# Patient Record
Sex: MALE | Race: OTHER | URBAN - METROPOLITAN AREA
[De-identification: names, ages, dates, MRNs, and addresses within clinical notes are randomized per-mention and may not be internally consistent; named-entity substitution may affect disease eponyms.]

---

## 2019-02-07 VITALS
OXYGEN SATURATION: 100 % | RESPIRATION RATE: 17 BRPM | SYSTOLIC BLOOD PRESSURE: 131 MMHG | HEART RATE: 87 BPM | TEMPERATURE: 98 F | DIASTOLIC BLOOD PRESSURE: 86 MMHG

## 2019-02-07 LAB
ALBUMIN SERPL ELPH-MCNC: 3.6 G/DL — SIGNIFICANT CHANGE UP (ref 3.4–5)
ALP SERPL-CCNC: 118 U/L — SIGNIFICANT CHANGE UP (ref 40–120)
ALT FLD-CCNC: 28 U/L — SIGNIFICANT CHANGE UP (ref 12–42)
ANION GAP SERPL CALC-SCNC: 5 MMOL/L — LOW (ref 9–16)
APTT BLD: 30.9 SEC — SIGNIFICANT CHANGE UP (ref 27.5–36.3)
AST SERPL-CCNC: 21 U/L — SIGNIFICANT CHANGE UP (ref 15–37)
BILIRUB SERPL-MCNC: 0.2 MG/DL — SIGNIFICANT CHANGE UP (ref 0.2–1.2)
BUN SERPL-MCNC: 14 MG/DL — SIGNIFICANT CHANGE UP (ref 7–23)
CALCIUM SERPL-MCNC: 9.2 MG/DL — SIGNIFICANT CHANGE UP (ref 8.5–10.5)
CHLORIDE SERPL-SCNC: 102 MMOL/L — SIGNIFICANT CHANGE UP (ref 96–108)
CO2 SERPL-SCNC: 30 MMOL/L — SIGNIFICANT CHANGE UP (ref 22–31)
CREAT SERPL-MCNC: 0.93 MG/DL — SIGNIFICANT CHANGE UP (ref 0.5–1.3)
CRP SERPL-MCNC: 0.9 MG/DL — SIGNIFICANT CHANGE UP (ref 0–0.9)
GLUCOSE SERPL-MCNC: 108 MG/DL — HIGH (ref 70–99)
HCT VFR BLD CALC: 39.1 % — SIGNIFICANT CHANGE UP (ref 39–50)
HGB BLD-MCNC: 12.4 G/DL — LOW (ref 13–17)
INR BLD: 0.99 — SIGNIFICANT CHANGE UP (ref 0.88–1.16)
LACTATE SERPL-SCNC: 1.6 MMOL/L — SIGNIFICANT CHANGE UP (ref 0.4–2)
MCHC RBC-ENTMCNC: 27.8 PG — SIGNIFICANT CHANGE UP (ref 27–34)
MCHC RBC-ENTMCNC: 31.7 G/DL — LOW (ref 32–36)
MCV RBC AUTO: 87.7 FL — SIGNIFICANT CHANGE UP (ref 80–100)
PCP SPEC-MCNC: SIGNIFICANT CHANGE UP
PLATELET # BLD AUTO: 333 K/UL — SIGNIFICANT CHANGE UP (ref 150–400)
POTASSIUM SERPL-MCNC: 3.8 MMOL/L — SIGNIFICANT CHANGE UP (ref 3.5–5.3)
POTASSIUM SERPL-SCNC: 3.8 MMOL/L — SIGNIFICANT CHANGE UP (ref 3.5–5.3)
PROT SERPL-MCNC: 7.5 G/DL — SIGNIFICANT CHANGE UP (ref 6.4–8.2)
PROTHROM AB SERPL-ACNC: 10.9 SEC — SIGNIFICANT CHANGE UP (ref 10–12.9)
RBC # BLD: 4.46 M/UL — SIGNIFICANT CHANGE UP (ref 4.2–5.8)
RBC # FLD: 14.4 % — SIGNIFICANT CHANGE UP (ref 10.3–14.5)
SODIUM SERPL-SCNC: 137 MMOL/L — SIGNIFICANT CHANGE UP (ref 132–145)
WBC # BLD: 6.3 K/UL — SIGNIFICANT CHANGE UP (ref 3.8–10.5)
WBC # FLD AUTO: 6.3 K/UL — SIGNIFICANT CHANGE UP (ref 3.8–10.5)

## 2019-02-07 PROCEDURE — 73130 X-RAY EXAM OF HAND: CPT | Mod: 26,50

## 2019-02-07 PROCEDURE — 73201 CT UPPER EXTREMITY W/DYE: CPT | Mod: 26,RT

## 2019-02-07 PROCEDURE — 71046 X-RAY EXAM CHEST 2 VIEWS: CPT | Mod: 26

## 2019-02-07 RX ORDER — OXYCODONE AND ACETAMINOPHEN 5; 325 MG/1; MG/1
1 TABLET ORAL ONCE
Qty: 0 | Refills: 0 | Status: DISCONTINUED | OUTPATIENT
Start: 2019-02-07 | End: 2019-02-07

## 2019-02-07 RX ORDER — VANCOMYCIN HCL 1 G
1000 VIAL (EA) INTRAVENOUS ONCE
Qty: 0 | Refills: 0 | Status: COMPLETED | OUTPATIENT
Start: 2019-02-07 | End: 2019-02-07

## 2019-02-07 RX ADMIN — Medication 250 MILLIGRAM(S): at 20:31

## 2019-02-07 RX ADMIN — OXYCODONE AND ACETAMINOPHEN 1 TABLET(S): 5; 325 TABLET ORAL at 20:31

## 2019-02-07 NOTE — ED PROVIDER NOTE - DIAGNOSTIC INTERPRETATION
Xray (wet reads) interpreted by MARLENI CHISHOLM   Xray hands - no soft tissue swelling. no acute fx or dislocation, joint space intact, no effusion noted. No foreign body noted Xray (wet reads) interpreted by MARLENI CHISHOLM   Xray hands - +DJD changes, minimal soft tissue swelling. no acute fx or dislocation, joint space intact, no effusion noted. No foreign body noted

## 2019-02-07 NOTE — ED PROVIDER NOTE - OBJECTIVE STATEMENT
54 yo M with PMHx of 54 yo M with PMHx of HIV, last CD4 unknown, VL undetectable, on HAART, RHD, presenting c/o R hand numbness and pain x 5d.  Pt reports using some crystal meth and GHB 1 wk ago, "passed out a few times during that night and unsure what happened subsequently." Woke up feeling "funny" in his hands.  Noted progressive worsening numbness, swelling, and bruising to the fingertips, R>L, went to clinic today and sent in for further eval.  Denies fever, chills, known trauma, fall, joint pain, CP, SOB, N/V/D/C, focal weakness, rash, abdominal pain, change in urinary/bowel function, malaise, dysuria, hematuria, penile dc and bleeding.  Denies IVDU, however, pt reports being with other users who was injecting and unsure if he was exposed to anything when he was out.

## 2019-02-07 NOTE — ED PROVIDER NOTE - MUSCULOSKELETAL, MLM
Spine appears normal, range of motion is not limited, no muscle or joint tenderness Spine appears normal, b/l hand with mild edema to the distal finger tips, R>L with +janeway lesions to the R fingertips, NV intact, FROM, diminished light touch sensation to the hands b/l, symmetric distal palpable pulses Spine appears normal, b/l hand with mild edema to the distal finger tips, R>L with +janeway lesions to the R fingertips and splinter hemorrhages, NV intact, FROM, diminished light touch sensation to the hands b/l, symmetric distal palpable pulses, compartments soft
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
## 2019-02-07 NOTE — ED ADULT NURSE REASSESSMENT NOTE - NS ED NURSE REASSESS COMMENT FT1
pt upset, states "I was told by the doctor I would be in a bed already". explained to patient that there are no clean beds available at this time. notified charge RN. pt upset, states "I was told by the doctor I would be in a bed already". explained to patient that there are no clean beds available at this time. notified charge RN. comfort measures offered- pt refusing.

## 2019-02-07 NOTE — ED PROVIDER NOTE - PROGRESS NOTE DETAILS
patient admitted. spent night in ED. no beds at [facility]. vancomycin given at 8:30am. repeat AM labs drawn. no pain over night

## 2019-02-07 NOTE — ED ADULT NURSE NOTE - NSIMPLEMENTINTERV_GEN_ALL_ED
Implemented All Universal Safety Interventions:  [place] to call system. Call bell, personal items and telephone within reach. Instruct patient to call for assistance. Room bathroom lighting operational. Non-slip footwear when patient is off stretcher. Physically safe environment: no spills, clutter or unnecessary equipment. Stretcher in lowest position, wheels locked, appropriate side rails in place.

## 2019-02-07 NOTE — ED PROVIDER NOTE - MEDICAL DECISION MAKING DETAILS
pt p/w R hand lesions and numbness sensation from unknown mechanism, +mild swelling to the distal finger tips with lesions concerning for janeway/osler, no associated constitutional sx, afebrile, compartment soft with palpable symmetric distal pulses, sx concerning for endocarditis/septic emboli given history and risk factors, labs with no leukocytosis or lactic acidosis, lytes wnl, CTA with no arterial emboli noted, blood cx obtained and sent, empirically started on dose of IV vanco, case discussed with [facility] for endocarditis r/o, accepted by Dr. Rider, consulted with ortho - Dr. Marroquin for ancillary findings on CTA, unlikely acute or any ortho intervention based on clinical and CT read, can consult hand resident upon arrival PRN

## 2019-02-07 NOTE — ED ADULT NURSE REASSESSMENT NOTE - NS ED NURSE REASSESS COMMENT FT1
received pt from ELVA Kaye- pt resting in stretcher, medications administetred as ordered, in no acute distress.

## 2019-02-08 ENCOUNTER — INPATIENT (INPATIENT)
Facility: HOSPITAL | Age: 54
LOS: 1 days | Discharge: HOME | DRG: 866 | End: 2019-02-10
Attending: INTERNAL MEDICINE | Admitting: INTERNAL MEDICINE
Payer: COMMERCIAL

## 2019-02-08 DIAGNOSIS — F15.10 OTHER STIMULANT ABUSE, UNCOMPLICATED: ICD-10-CM

## 2019-02-08 DIAGNOSIS — A53.9 SYPHILIS, UNSPECIFIED: ICD-10-CM

## 2019-02-08 DIAGNOSIS — Z29.9 ENCOUNTER FOR PROPHYLACTIC MEASURES, UNSPECIFIED: ICD-10-CM

## 2019-02-08 DIAGNOSIS — F19.94 OTHER PSYCHOACTIVE SUBSTANCE USE, UNSPECIFIED WITH PSYCHOACTIVE SUBSTANCE-INDUCED MOOD DISORDER: ICD-10-CM

## 2019-02-08 DIAGNOSIS — F32.9 MAJOR DEPRESSIVE DISORDER, SINGLE EPISODE, UNSPECIFIED: ICD-10-CM

## 2019-02-08 DIAGNOSIS — L02.212 CUTANEOUS ABSCESS OF BACK [ANY PART, EXCEPT BUTTOCK]: Chronic | ICD-10-CM

## 2019-02-08 DIAGNOSIS — F19.10 OTHER PSYCHOACTIVE SUBSTANCE ABUSE, UNCOMPLICATED: ICD-10-CM

## 2019-02-08 DIAGNOSIS — A51.49 OTHER SECONDARY SYPHILITIC CONDITIONS: ICD-10-CM

## 2019-02-08 DIAGNOSIS — Z90.49 ACQUIRED ABSENCE OF OTHER SPECIFIED PARTS OF DIGESTIVE TRACT: Chronic | ICD-10-CM

## 2019-02-08 DIAGNOSIS — Z98.890 OTHER SPECIFIED POSTPROCEDURAL STATES: Chronic | ICD-10-CM

## 2019-02-08 DIAGNOSIS — B20 HUMAN IMMUNODEFICIENCY VIRUS [HIV] DISEASE: ICD-10-CM

## 2019-02-08 DIAGNOSIS — Z91.89 OTHER SPECIFIED PERSONAL RISK FACTORS, NOT ELSEWHERE CLASSIFIED: ICD-10-CM

## 2019-02-08 DIAGNOSIS — L03.90 CELLULITIS, UNSPECIFIED: ICD-10-CM

## 2019-02-08 LAB
ALBUMIN SERPL ELPH-MCNC: 3.2 G/DL — LOW (ref 3.4–5)
ALP SERPL-CCNC: 107 U/L — SIGNIFICANT CHANGE UP (ref 40–120)
ALT FLD-CCNC: 28 U/L — SIGNIFICANT CHANGE UP (ref 12–42)
ANION GAP SERPL CALC-SCNC: 9 MMOL/L — SIGNIFICANT CHANGE UP (ref 9–16)
AST SERPL-CCNC: 20 U/L — SIGNIFICANT CHANGE UP (ref 15–37)
BASOPHILS NFR BLD AUTO: 0.3 % — SIGNIFICANT CHANGE UP (ref 0–2)
BILIRUB SERPL-MCNC: 0.1 MG/DL — LOW (ref 0.2–1.2)
BUN SERPL-MCNC: 15 MG/DL — SIGNIFICANT CHANGE UP (ref 7–23)
C TRACH RRNA SPEC QL NAA+PROBE: SIGNIFICANT CHANGE UP
CALCIUM SERPL-MCNC: 8.9 MG/DL — SIGNIFICANT CHANGE UP (ref 8.5–10.5)
CHLORIDE SERPL-SCNC: 103 MMOL/L — SIGNIFICANT CHANGE UP (ref 96–108)
CO2 SERPL-SCNC: 27 MMOL/L — SIGNIFICANT CHANGE UP (ref 22–31)
CREAT SERPL-MCNC: 0.91 MG/DL — SIGNIFICANT CHANGE UP (ref 0.5–1.3)
EOSINOPHIL NFR BLD AUTO: 1.4 % — SIGNIFICANT CHANGE UP (ref 0–6)
GLUCOSE SERPL-MCNC: 102 MG/DL — HIGH (ref 70–99)
HCT VFR BLD CALC: 38.6 % — LOW (ref 39–50)
HGB BLD-MCNC: 12.4 G/DL — LOW (ref 13–17)
IMM GRANULOCYTES NFR BLD AUTO: 0.3 % — SIGNIFICANT CHANGE UP (ref 0–1.5)
LYMPHOCYTES # BLD AUTO: 26.2 % — SIGNIFICANT CHANGE UP (ref 13–44)
MCHC RBC-ENTMCNC: 27.9 PG — SIGNIFICANT CHANGE UP (ref 27–34)
MCHC RBC-ENTMCNC: 32.1 G/DL — SIGNIFICANT CHANGE UP (ref 32–36)
MCV RBC AUTO: 86.9 FL — SIGNIFICANT CHANGE UP (ref 80–100)
MONOCYTES NFR BLD AUTO: 10.1 % — SIGNIFICANT CHANGE UP (ref 2–14)
N GONORRHOEA RRNA SPEC QL NAA+PROBE: SIGNIFICANT CHANGE UP
NEUTROPHILS NFR BLD AUTO: 61.7 % — SIGNIFICANT CHANGE UP (ref 43–77)
PLATELET # BLD AUTO: 327 K/UL — SIGNIFICANT CHANGE UP (ref 150–400)
POTASSIUM SERPL-MCNC: 4.2 MMOL/L — SIGNIFICANT CHANGE UP (ref 3.5–5.3)
POTASSIUM SERPL-SCNC: 4.2 MMOL/L — SIGNIFICANT CHANGE UP (ref 3.5–5.3)
PROT SERPL-MCNC: 6.8 G/DL — SIGNIFICANT CHANGE UP (ref 6.4–8.2)
RBC # BLD: 4.44 M/UL — SIGNIFICANT CHANGE UP (ref 4.2–5.8)
RBC # FLD: 14.5 % — SIGNIFICANT CHANGE UP (ref 10.3–14.5)
SODIUM SERPL-SCNC: 139 MMOL/L — SIGNIFICANT CHANGE UP (ref 132–145)
SPECIMEN SOURCE: SIGNIFICANT CHANGE UP
T PALLIDUM AB TITR SER: POSITIVE
WBC # BLD: 6.2 K/UL — SIGNIFICANT CHANGE UP (ref 3.8–10.5)
WBC # FLD AUTO: 6.2 K/UL — SIGNIFICANT CHANGE UP (ref 3.8–10.5)

## 2019-02-08 PROCEDURE — 93010 ELECTROCARDIOGRAM REPORT: CPT

## 2019-02-08 PROCEDURE — 73130 X-RAY EXAM OF HAND: CPT | Mod: 26,50

## 2019-02-08 PROCEDURE — 71250 CT THORAX DX C-: CPT | Mod: 26

## 2019-02-08 PROCEDURE — 99285 EMERGENCY DEPT VISIT HI MDM: CPT | Mod: 25

## 2019-02-08 PROCEDURE — 99223 1ST HOSP IP/OBS HIGH 75: CPT | Mod: GC

## 2019-02-08 RX ORDER — PENICILLIN G BENZATHINE 1200000 [IU]/2ML
2.4 INJECTION, SUSPENSION INTRAMUSCULAR ONCE
Qty: 0 | Refills: 0 | Status: COMPLETED | OUTPATIENT
Start: 2019-02-08 | End: 2019-02-08

## 2019-02-08 RX ORDER — VANCOMYCIN HCL 1 G
VIAL (EA) INTRAVENOUS
Qty: 0 | Refills: 0 | Status: DISCONTINUED | OUTPATIENT
Start: 2019-02-08 | End: 2019-02-09

## 2019-02-08 RX ORDER — OXYCODONE AND ACETAMINOPHEN 5; 325 MG/1; MG/1
1 TABLET ORAL ONCE
Qty: 0 | Refills: 0 | Status: DISCONTINUED | OUTPATIENT
Start: 2019-02-08 | End: 2019-02-08

## 2019-02-08 RX ORDER — ELVITEGRAVIR, COBICISTAT, EMTRICITABINE, AND TENOFOVIR ALAFENAMIDE 150; 150; 200; 10 MG/1; MG/1; MG/1; MG/1
1 TABLET ORAL
Qty: 0 | Refills: 0 | COMMUNITY

## 2019-02-08 RX ORDER — ELVITEGRAVIR, COBICISTAT, EMTRICITABINE, AND TENOFOVIR ALAFENAMIDE 150; 150; 200; 10 MG/1; MG/1; MG/1; MG/1
1 TABLET ORAL DAILY
Qty: 0 | Refills: 0 | Status: DISCONTINUED | OUTPATIENT
Start: 2019-02-08 | End: 2019-02-10

## 2019-02-08 RX ORDER — HYDROMORPHONE HYDROCHLORIDE 2 MG/ML
1 INJECTION INTRAMUSCULAR; INTRAVENOUS; SUBCUTANEOUS ONCE
Qty: 0 | Refills: 0 | Status: DISCONTINUED | OUTPATIENT
Start: 2019-02-08 | End: 2019-02-08

## 2019-02-08 RX ORDER — VANCOMYCIN HCL 1 G
1250 VIAL (EA) INTRAVENOUS EVERY 12 HOURS
Qty: 0 | Refills: 0 | Status: DISCONTINUED | OUTPATIENT
Start: 2019-02-09 | End: 2019-02-09

## 2019-02-08 RX ORDER — VANCOMYCIN HCL 1 G
1250 VIAL (EA) INTRAVENOUS ONCE
Qty: 0 | Refills: 0 | Status: COMPLETED | OUTPATIENT
Start: 2019-02-08 | End: 2019-02-08

## 2019-02-08 RX ORDER — VANCOMYCIN HCL 1 G
1000 VIAL (EA) INTRAVENOUS ONCE
Qty: 0 | Refills: 0 | Status: COMPLETED | OUTPATIENT
Start: 2019-02-08 | End: 2019-02-08

## 2019-02-08 RX ORDER — MOMETASONE FUROATE 50 UG/1
2 SPRAY NASAL
Qty: 0 | Refills: 0 | COMMUNITY

## 2019-02-08 RX ADMIN — PENICILLIN G BENZATHINE 2.4 MILLION UNIT(S): 1200000 INJECTION, SUSPENSION INTRAMUSCULAR at 18:35

## 2019-02-08 RX ADMIN — ELVITEGRAVIR, COBICISTAT, EMTRICITABINE, AND TENOFOVIR ALAFENAMIDE 1 TABLET(S): 150; 150; 200; 10 TABLET ORAL at 22:31

## 2019-02-08 RX ADMIN — HYDROMORPHONE HYDROCHLORIDE 1 MILLIGRAM(S): 2 INJECTION INTRAMUSCULAR; INTRAVENOUS; SUBCUTANEOUS at 13:27

## 2019-02-08 RX ADMIN — Medication 166.67 MILLIGRAM(S): at 22:31

## 2019-02-08 RX ADMIN — Medication 250 MILLIGRAM(S): at 07:18

## 2019-02-08 RX ADMIN — OXYCODONE AND ACETAMINOPHEN 1 TABLET(S): 5; 325 TABLET ORAL at 17:32

## 2019-02-08 NOTE — H&P ADULT - NSHPLABSRESULTS_GEN_ALL_CORE
.  LABS:                         12.4   6.2   )-----------( 327      ( 08 Feb 2019 07:24 )             38.6     02-08    139  |  103  |  15  ----------------------------<  102<H>  4.2   |  27  |  0.91    Ca    8.9      08 Feb 2019 07:24    TPro  6.8  /  Alb  3.2<L>  /  TBili  0.1<L>  /  DBili  x   /  AST  20  /  ALT  28  /  AlkPhos  107  02-08    PT/INR - ( 07 Feb 2019 16:28 )   PT: 10.9 sec;   INR: 0.99          PTT - ( 07 Feb 2019 16:28 )  PTT:30.9 sec              RADIOLOGY, EKG & ADDITIONAL TESTS: Reviewed.

## 2019-02-08 NOTE — H&P ADULT - ASSESSMENT
52 yo M with PMHx of HIV, last CD4 unknown, VL undetectable, on HAART, RHD, presenting c/o R hand numbness and pain x 5d. 52 yo M with PMHx of HIV, last CD4 unknown, VL undetectable, on HAART, RHD, presenting c/o R hand numbness and pain x 5d, with exam findings concerning for endocarditis, admitted for traetment/workup of endocarditis.

## 2019-02-08 NOTE — H&P ADULT - PROBLEM SELECTOR PLAN 1
Patient presenting with pain at fingertips of hands b/l, +splinter hemorrhage and painful ecchymotic lesions (?Osler nodes) on R hand, no appreciable murmur on exam. Findings concerning for endocarditis given polysubstance abuse (though denies IVDU)  -s/p vancomycin in ED, c/w current regimen 1.25 mg vancomycin q12h for empiric treatment of endocarditis  -f/u Bcx  -f/u ECHO to assess for vegetations Patient presenting with pain at fingertips of hands b/l, +splinter hemorrhage and painful ecchymotic lesions (Janeway lesions/  Osler nodes) on R hand, no appreciable murmur on exam. Findings concerning for endocarditis given polysubstance abuse (though denies IVDU)  -s/p vancomycin in ED, c/w current regimen 1.25 mg vancomycin q12h for empiric treatment of endocarditis  -f/u Bcx  -f/u ECHO to assess for vegetations

## 2019-02-08 NOTE — H&P ADULT - PROBLEM SELECTOR PLAN 2
Patient with history of HIV, per patient CD4 800-900 with undetectable VL approximately 6 months ago), non-compliant with outpatient Genvoya  -c/w home medication, Genvoya  -f/u VL/CD4   -HIV consult in AM

## 2019-02-08 NOTE — H&P ADULT - FAMILY HISTORY
Mother  Still living? Unknown  Family history of systemic lupus erythematosus (SLE) in mother, Age at diagnosis: Age Unknown     Sibling  Still living? Unknown  Family history of diabetes mellitus, Age at diagnosis: Age Unknown Mother  Still living? Unknown  Family history of systemic lupus erythematosus (SLE) in mother, Age at diagnosis: Age Unknown     Sibling  Still living? Unknown  Family history of diabetes mellitus, Age at diagnosis: Age Unknown     Father  Still living? Unknown  Family history of diabetes mellitus (DM), Age at diagnosis: Age Unknown

## 2019-02-08 NOTE — H&P ADULT - ATTENDING COMMENTS
patient seen and examined    reviewed data including:  labs, available radiological reports/ studies, ekg, previous admission chart notes and/or imaging      agree w/ PE findings as above w/ additions/ exceptions/ pertinent findings: +tenderness on palpation of right hand / fingers;  exam deferred, findings per PGY2; rest of exam as above     1. hand lesions r/o endocarditis given polysubstance hx; started on vancomycin empirically; follwoup ctxs and  ECHO  2. IVDA: encourage cessation    rest of plan as above

## 2019-02-08 NOTE — H&P ADULT - PROBLEM SELECTOR PLAN 8
1) PCP Contacted on Admission: (Y/N) --> Name & Phone #:  Follows at outpatient LGBT clinic [facility]; Amado Kimble MD (ID specialist @ [facility])  2) Date of Contact with PCP:  3) PCP Contacted at Discharge: (Y/N)  4) Summary of Handoff Given to PCP:   5) Post-Discharge Appointment Date and Location:

## 2019-02-08 NOTE — H&P ADULT - PROBLEM SELECTOR PLAN 5
F: Patient with history of polysubstance abuse, current methamphetamine use (last used: 5 days ago), with prior crack/Marijuana use. Utox negative on admission  - consult for polysubstance abuse

## 2019-02-08 NOTE — BEHAVIORAL HEALTH ASSESSMENT NOTE - PROBLEM SELECTOR PLAN 1
-- Does NOT meet criteria for involuntary psychiatric hospitalization.  -- Does NOT require psychiatric 1:1 observation  -- Provided with referrals to outpatient psychiatric clinics.

## 2019-02-08 NOTE — ED ADULT NURSE REASSESSMENT NOTE - NS ED NURSE REASSESS COMMENT FT1
pt resting in stretcher in no acute distress. no clean bed at [facility]. awaiting clean bed and transfer.

## 2019-02-08 NOTE — BEHAVIORAL HEALTH ASSESSMENT NOTE - PROBLEM SELECTOR PLAN 2
-- Provided with referrals for substance use treatment  -- Denies alcohol, benzo, opiate use, however recommend remaining alert for withdrawal symptoms.

## 2019-02-08 NOTE — H&P ADULT - PROBLEM SELECTOR PLAN 7
1) PCP Contacted on Admission: (Y/N) --> Name & Phone #:  Follows at outpatient LGBT clinic [facility]; Amado Kimble MD (ID specialist @ [facility])  2) Date of Contact with PCP:  3) PCP Contacted at Discharge: (Y/N)  4) Summary of Handoff Given to PCP:   5) Post-Discharge Appointment Date and Location: F: no standing IVF indicated at this time  E: Replete electrolytes PRN, Goal: K>4, Mg>2  N: Regular diet    DVT ppx: ambulatory, no chemoppx indicated at this time   CODE: FULL  Dispo: Admit to [facility]

## 2019-02-08 NOTE — H&P ADULT - PROBLEM SELECTOR PLAN 3
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
## 2019-02-08 NOTE — H&P ADULT - PROBLEM SELECTOR PLAN 6
1) PCP Contacted on Admission: (Y/N) --> Name & Phone #:  2) Date of Contact with PCP:  3) PCP Contacted at Discharge: (Y/N)  4) Summary of Handoff Given to PCP:   5) Post-Discharge Appointment Date and Location: F: no standing IVF indicated at this time  E: Replete electrolytes PRN, Goal: K>4, Mg>2  N: Regular diet    DVT ppx: ambulatory, no chemoppx indicated at this time   CODE: FULL  Dispo: Admit to [facility] Patient with history of depression/anxiety, expressing suicidal thoughts however with no intent or plan.   -Psych consulted recommending no intervention or initiation of anti-depressants at this time  -Patient provided information for outpatient follow up

## 2019-02-08 NOTE — H&P ADULT - NSHPSOCIALHISTORY_GEN_ALL_CORE
Current smokes cigarettes (since the age of 11, smokes when intoxicated), denies EtOH use, Current Methamphetamine use (uses approx 4 out of 7 days of the week for 3-4 years), prior crack use and marijuana use

## 2019-02-08 NOTE — H&P ADULT - NSHPPHYSICALEXAM_GEN_ALL_CORE
.  VITAL SIGNS:  T(C): 36.4 (02-08-19 @ 19:55), Max: 37.2 (02-07-19 @ 20:52)  T(F): 97.5 (02-08-19 @ 19:55), Max: 98.9 (02-07-19 @ 20:52)  HR: 67 (02-08-19 @ 19:55) (67 - 82)  BP: 115/74 (02-08-19 @ 19:55) (104/66 - 128/69)  BP(mean): --  RR: 18 (02-08-19 @ 19:55) (16 - 18)  SpO2: 97% (02-08-19 @ 19:55) (97% - 100%)  Wt(kg): --    PHYSICAL EXAM:    Constitutional: WDWN resting comfortably in bed; NAD  Head: NC/AT  Eyes: PERRL, EOMI, anicteric sclera  ENT: no nasal discharge; uvula midline, no oropharyngeal erythema or exudates; MMM  Neck: supple; no JVD or thyromegaly  Respiratory: CTA B/L; no W/R/R, no retractions  Cardiac: +S1/S2; RRR; no M/R/G; PMI non-displaced  Gastrointestinal: abdomen soft, NT/ND; no rebound or guarding; +BSx4  Genitourinary: normal external genitalia  Back: spine midline, no bony tenderness or step-offs; no CVAT B/L  Extremities: WWP, no clubbing or cyanosis; no peripheral edema  Musculoskeletal: NROM x4; no joint swelling, tenderness or erythema  Vascular: 2+ radial, femoral, DP/PT pulses B/L  Dermatologic: skin warm, dry and intact; no rashes, wounds, or scars  Lymphatic: no submandibular or cervical LAD  Neurologic: AAOx3; CNII-XII grossly intact; no focal deficits  Psychiatric: affect and characteristics of appearance, verbalizations, behaviors are appropriate .  VITAL SIGNS:  T(C): 36.4 (02-08-19 @ 19:55), Max: 37.2 (02-07-19 @ 20:52)  T(F): 97.5 (02-08-19 @ 19:55), Max: 98.9 (02-07-19 @ 20:52)  HR: 67 (02-08-19 @ 19:55) (67 - 82)  BP: 115/74 (02-08-19 @ 19:55) (104/66 - 128/69)  BP(mean): --  RR: 18 (02-08-19 @ 19:55) (16 - 18)  SpO2: 97% (02-08-19 @ 19:55) (97% - 100%)  Wt(kg): --    PHYSICAL EXAM:    Constitutional: thin male, resting comfortably in bed; NAD, partner at bedside, flat affect  Head: NC/AT  Eyes: PERRL, EOMI, anicteric sclera  ENT: no nasal discharge; uvula midline, no oropharyngeal erythema or exudates or sores; MMM  Neck: supple; no JVD or thyromegaly  Respiratory: CTA B/L; no W/R/R, no retractions  Cardiac: +S1/S2; RRR; no M/R/G; PMI non-displaced  Gastrointestinal: abdomen soft, NT/ND; no rebound or guarding; +BSx4  Genitourinary: normal external genitalia  Back: spine midline, no bony tenderness or step-offs; no CVAT B/L; 3-4 cm keloid on L thoracic side of back (from prior abscess drainage, likely I&D)  Extremities: WWP, no clubbing or cyanosis; no peripheral edema  Musculoskeletal: NROM x4; no joint swelling, tenderness or erythema  Vascular: 2+ radial, femoral, DP/PT pulses B/L  Dermatologic: splinter hemorrhage under R hand thumb nail bed, ecchymotic lesions at tips of fingers of R hand, macular lesions on palmar surface of hands b/l  Lymphatic: no submandibular or cervical LAD  Neurologic: AAOx3; CNII-XII grossly intact; no focal deficits  Psychiatric: flat affect

## 2019-02-08 NOTE — BEHAVIORAL HEALTH ASSESSMENT NOTE - HPI (INCLUDE ILLNESS QUALITY, SEVERITY, DURATION, TIMING, CONTEXT, MODIFYING FACTORS, ASSOCIATED SIGNS AND SYMPTOMS)
Patient is a 54 yo M with PMHx of HIV, last CD4 unknown, VL undetectable, on HAART, RHD,  and PPHx of stimulant use disorder who presents to [facility] c/o R hand numbness and pain x 5d. Found by primary team to have progressive worsening numbness, swelling, and bruising to the fingertips, R>L.  Denies IVDU, however, pt reports being with other users who was injecting and unsure if he was exposed to anything when he was out. Currently being worked up for endocarditis. Patient admitted to low mood and experiencing suicidal thoughts without intent or plan two weeks ago. Psychiatry consulted to evaluate SI.     On interview patient reports that during the few week he has had significant use of crystal meth and GHB. He notes that he cannot tell if his memories were of events that actually happened or not given his intoxication during those times. However he notes that during his periods of sobriety his mood was very low. Two weeks ago he had passive suicidal thoughts, but firmly denies any intent or plan to kill himself. He cites his children and his  as protective factors. Notes that his mood drops this way every time he uses these substances. Mood typically recovers, though notes that he has stressors in his life. In particular he regrets career decisions he made over the past two years. Currently denies any suicidal thoughts, plans, or intent. He denies HI/AVH. He is notably future and goal oriented, eager to receive information about psychiatric care for depression and substance use. He also notes that he has an excellent support system through [employer], which he actively participates in. He is comfortable alerting staff if his mood worsens or he develops passive or active SI.

## 2019-02-08 NOTE — H&P ADULT - PROBLEM SELECTOR PLAN 4
Patient with history of depression/anxiety, expressing suicidal thoughts however with no intent or plan.   -Psych consulted recommending no intervention or initiation of anti-depressants at this time  -Patient provided information for outpatient follow up mild; monitor CBC, check iron studies if worsening

## 2019-02-08 NOTE — BEHAVIORAL HEALTH ASSESSMENT NOTE - SUMMARY
Patient is a 54 yo M with PMHx of HIV, last CD4 unknown, VL undetectable, on HAART, RHD,  and PPHx of stimulant use disorder who presents to [facility] c/o R hand numbness and pain x 5d. Currently being worked up for endocarditis. Patient admitted to low mood and experiencing suicidal thoughts without intent or plan two weeks ago. Psychiatry consulted to evaluate SI.     Based on evaluation, patient is likely experiencing post-stimulant use drop in mood, consistent with his report of his mood fluctuations in the past. While he admits to recent passive SI he firmly denies any active thoughts, plans, or intent. Family is a strong protective factor. He also has a good support system through narcotic anonymous. At this time patient is not an acute danger to himself or others, does not meet criteria for involuntary psychiatric hospitalization. He would benefit most from outpatient psychiatric and substance use treatment. Patient was provided with referrals.

## 2019-02-08 NOTE — BEHAVIORAL HEALTH ASSESSMENT NOTE - RISK ASSESSMENT
Patient at elevated chronic risk of self harm given history of significant substance use, however currently not an acute danger to himself. Risk is mitigated by protective factors (family is reason to live), support system, future/goal orientation.

## 2019-02-08 NOTE — H&P ADULT - PMH
Anxiety    Depression    HIV (human immunodeficiency virus infection)    Pneumocystis carinii pneumonia  2007

## 2019-02-09 DIAGNOSIS — I38 ENDOCARDITIS, VALVE UNSPECIFIED: ICD-10-CM

## 2019-02-09 DIAGNOSIS — F19.10 OTHER PSYCHOACTIVE SUBSTANCE ABUSE, UNCOMPLICATED: ICD-10-CM

## 2019-02-09 DIAGNOSIS — D64.9 ANEMIA, UNSPECIFIED: ICD-10-CM

## 2019-02-09 LAB
-  COAGULASE NEGATIVE STAPHYLOCOCCUS: SIGNIFICANT CHANGE UP
ANION GAP SERPL CALC-SCNC: 9 MMOL/L — SIGNIFICANT CHANGE UP (ref 5–17)
APPEARANCE UR: CLEAR — SIGNIFICANT CHANGE UP
BILIRUB UR-MCNC: NEGATIVE — SIGNIFICANT CHANGE UP
BUN SERPL-MCNC: 16 MG/DL — SIGNIFICANT CHANGE UP (ref 7–23)
CALCIUM SERPL-MCNC: 8.8 MG/DL — SIGNIFICANT CHANGE UP (ref 8.4–10.5)
CHLORIDE SERPL-SCNC: 101 MMOL/L — SIGNIFICANT CHANGE UP (ref 96–108)
CO2 SERPL-SCNC: 27 MMOL/L — SIGNIFICANT CHANGE UP (ref 22–31)
COLOR SPEC: YELLOW — SIGNIFICANT CHANGE UP
CREAT SERPL-MCNC: 0.84 MG/DL — SIGNIFICANT CHANGE UP (ref 0.5–1.3)
DIFF PNL FLD: NEGATIVE — SIGNIFICANT CHANGE UP
GLUCOSE SERPL-MCNC: 105 MG/DL — HIGH (ref 70–99)
GLUCOSE UR QL: NEGATIVE — SIGNIFICANT CHANGE UP
GRAM STN FLD: SIGNIFICANT CHANGE UP
HCT VFR BLD CALC: 38.9 % — LOW (ref 39–50)
HGB BLD-MCNC: 12.1 G/DL — LOW (ref 13–17)
KETONES UR-MCNC: NEGATIVE — SIGNIFICANT CHANGE UP
LEUKOCYTE ESTERASE UR-ACNC: NEGATIVE — SIGNIFICANT CHANGE UP
MAGNESIUM SERPL-MCNC: 2.1 MG/DL — SIGNIFICANT CHANGE UP (ref 1.6–2.6)
MCHC RBC-ENTMCNC: 27.8 PG — SIGNIFICANT CHANGE UP (ref 27–34)
MCHC RBC-ENTMCNC: 31.1 GM/DL — LOW (ref 32–36)
MCV RBC AUTO: 89.2 FL — SIGNIFICANT CHANGE UP (ref 80–100)
METHOD TYPE: SIGNIFICANT CHANGE UP
NITRITE UR-MCNC: NEGATIVE — SIGNIFICANT CHANGE UP
NRBC # BLD: 0 /100 WBCS — SIGNIFICANT CHANGE UP (ref 0–0)
PH UR: 7 — SIGNIFICANT CHANGE UP (ref 5–8)
PLATELET # BLD AUTO: 309 K/UL — SIGNIFICANT CHANGE UP (ref 150–400)
POTASSIUM SERPL-MCNC: 4.2 MMOL/L — SIGNIFICANT CHANGE UP (ref 3.5–5.3)
POTASSIUM SERPL-SCNC: 4.2 MMOL/L — SIGNIFICANT CHANGE UP (ref 3.5–5.3)
PROT UR-MCNC: NEGATIVE MG/DL — SIGNIFICANT CHANGE UP
RAPID RVP RESULT: SIGNIFICANT CHANGE UP
RBC # BLD: 4.36 M/UL — SIGNIFICANT CHANGE UP (ref 4.2–5.8)
RBC # FLD: 14.4 % — SIGNIFICANT CHANGE UP (ref 10.3–14.5)
SODIUM SERPL-SCNC: 137 MMOL/L — SIGNIFICANT CHANGE UP (ref 135–145)
SP GR SPEC: 1.01 — SIGNIFICANT CHANGE UP (ref 1–1.03)
UROBILINOGEN FLD QL: 0.2 E.U./DL — SIGNIFICANT CHANGE UP
VANCOMYCIN TROUGH SERPL-MCNC: 9.7 UG/ML — LOW (ref 10–20)
WBC # BLD: 6.88 K/UL — SIGNIFICANT CHANGE UP (ref 3.8–10.5)
WBC # FLD AUTO: 6.88 K/UL — SIGNIFICANT CHANGE UP (ref 3.8–10.5)

## 2019-02-09 PROCEDURE — 99233 SBSQ HOSP IP/OBS HIGH 50: CPT

## 2019-02-09 RX ORDER — OXYCODONE AND ACETAMINOPHEN 5; 325 MG/1; MG/1
1 TABLET ORAL EVERY 6 HOURS
Qty: 0 | Refills: 0 | Status: DISCONTINUED | OUTPATIENT
Start: 2019-02-09 | End: 2019-02-10

## 2019-02-09 RX ORDER — VANCOMYCIN HCL 1 G
1500 VIAL (EA) INTRAVENOUS EVERY 12 HOURS
Qty: 0 | Refills: 0 | Status: DISCONTINUED | OUTPATIENT
Start: 2019-02-09 | End: 2019-02-10

## 2019-02-09 RX ADMIN — Medication 166.67 MILLIGRAM(S): at 11:54

## 2019-02-09 RX ADMIN — ELVITEGRAVIR, COBICISTAT, EMTRICITABINE, AND TENOFOVIR ALAFENAMIDE 1 TABLET(S): 150; 150; 200; 10 TABLET ORAL at 11:54

## 2019-02-09 NOTE — PROGRESS NOTE ADULT - PROBLEM SELECTOR PLAN 4
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

## 2019-02-09 NOTE — PROGRESS NOTE ADULT - PROBLEM SELECTOR PLAN 3
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

## 2019-02-09 NOTE — PROGRESS NOTE ADULT - PROBLEM SELECTOR PLAN 5
Patient with history of polysubstance abuse, current methamphetamine use (last used: 5 days ago), with prior crack/Marijuana use. Utox negative on admission  - consult for polysubstance abuse Mild anemia. Hgb 12.1   - Continue to monitor CBC   - Order Fe studies if continues to worsen

## 2019-02-09 NOTE — PROGRESS NOTE ADULT - PROBLEM SELECTOR PLAN 7
F: no standing IVF indicated at this time  E: Replete electrolytes PRN, Goal: K>4, Mg>2  N: Regular diet    DVT ppx: ambulatory, no chemoppx indicated at this time   CODE: FULL  Dispo: Admit to [facility] F: no IVF at this time   E: Replete electrolytes PRN, Goal: K>4, Mg>2  N: Regular diet    DVT ppx: ambulatory, no chemoppx indicated at this time   CODE: FULL  Dispo: Admit to [facility]

## 2019-02-09 NOTE — PROGRESS NOTE ADULT - PROBLEM SELECTOR PLAN 6
Patient with history of depression/anxiety, expressing suicidal thoughts however with no intent or plan.   -Psych consulted recommending no intervention or initiation of anti-depressants at this time  -Patient provided information for outpatient follow up H/o depression/anxiety, expressing suicidal ideations on adm  -Psych consulted recommending no intervention or initiation of anti-depressants at this time  -Patient provided information for outpatient follow up

## 2019-02-09 NOTE — PROGRESS NOTE ADULT - PROBLEM SELECTOR PLAN 2
Patient with history of HIV, per patient CD4 800-900 with undetectable VL approximately 6 months ago), non-compliant with outpatient Genvoya  -c/w home medication, Genvoya  -f/u VL/CD4   -HIV consult in AM Polysubstance abuse. Methamphetamine with GHB daily. Denies IVDU. High likelihood of IVDU based on social history.   - CIWA 7-8 ( nausea, Anxiety, tremor, tactile disturbances)  - Serial CIWA q4-6h  - Consider giving Ativan if CIWA >8, symptoms worsen

## 2019-02-09 NOTE — PROGRESS NOTE ADULT - PROBLEM SELECTOR PLAN 1
Patient presenting with pain at fingertips of hands b/l, +splinter hemorrhage and painful ecchymotic lesions (Janeway lesions/  Osler nodes) on R hand, no appreciable murmur on exam. Findings concerning for endocarditis given polysubstance abuse (though denies IVDU)  -s/p vancomycin in ED, c/w current regimen 1.25 mg vancomycin q12h for empiric treatment of endocarditis  -f/u Bcx  -f/u ECHO to assess for vegetations P/w numbness and sharp pain of hands and fingertips bilat (R>L) +splinter hemorrhage and painful ecchymotic lesions (Janeway lesions/ Osler nodes) on R hand, Hard to palpation of R index finger with ecchymosis. No appreciable murmur on exam. Findings concerning for endocarditis given polysubstance abuse (though denies IVDU)  - C/w vancomycin 1.25 mg vancomycin q12h for empiric treatment of endocarditis  - F/u Trough 22:00  - Restart Vancomycin after vanc trough results   - F/u Bcx. NGTD @ 1 day   - If patient spikes, will draw new Bcx   - F/u ECHO to assess for vegetations. Concern for bact endocarditis

## 2019-02-09 NOTE — PROGRESS NOTE ADULT - PROBLEM SELECTOR PLAN 8
1) PCP Contacted on Admission: (Y/N) --> Name & Phone #:  Follows at outpatient LGBT clinic [facility]; Amado Kimble MD (ID specialist @ [facility])  2) Date of Contact with PCP:  3) PCP Contacted at Discharge: (Y/N)  4) Summary of Handoff Given to PCP:   5) Post-Discharge Appointment Date and Location: 1) PCP Contacted on Admission: (Y/N) --> Name & Phone #:  Follows at outpatient LGBT clinic [facility]; Amado Kimble MD (ID specialist @ [facility])  2) Date of Contact with PCP: N/a  3) PCP Contacted at Discharge: (Y/N)  4) Summary of Handoff Given to PCP: N/a  5) Post-Discharge Appointment Date and Location: [facility]
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
## 2019-02-09 NOTE — PROGRESS NOTE ADULT - ASSESSMENT
54 yo M with PMHx of HIV (last CD4 unknown, VL undetectable, on HAART, RHD) presenting c/o R hand numbness and pain x 5d, with exam findings concerning for endocarditis, admitted for treatment/workup of endocarditis.

## 2019-02-10 VITALS
HEART RATE: 78 BPM | OXYGEN SATURATION: 98 % | SYSTOLIC BLOOD PRESSURE: 114 MMHG | TEMPERATURE: 98 F | DIASTOLIC BLOOD PRESSURE: 67 MMHG | RESPIRATION RATE: 20 BRPM

## 2019-02-10 LAB
ANION GAP SERPL CALC-SCNC: 9 MMOL/L — SIGNIFICANT CHANGE UP (ref 5–17)
BUN SERPL-MCNC: 15 MG/DL — SIGNIFICANT CHANGE UP (ref 7–23)
CALCIUM SERPL-MCNC: 9.1 MG/DL — SIGNIFICANT CHANGE UP (ref 8.4–10.5)
CHLORIDE SERPL-SCNC: 101 MMOL/L — SIGNIFICANT CHANGE UP (ref 96–108)
CO2 SERPL-SCNC: 28 MMOL/L — SIGNIFICANT CHANGE UP (ref 22–31)
CREAT SERPL-MCNC: 0.89 MG/DL — SIGNIFICANT CHANGE UP (ref 0.5–1.3)
CULTURE RESULTS: SIGNIFICANT CHANGE UP
ERYTHROCYTE [SEDIMENTATION RATE] IN BLOOD: 11 MM/HR — SIGNIFICANT CHANGE UP
GLUCOSE SERPL-MCNC: 106 MG/DL — HIGH (ref 70–99)
GRAM STN FLD: SIGNIFICANT CHANGE UP
HCT VFR BLD CALC: 40 % — SIGNIFICANT CHANGE UP (ref 39–50)
HGB BLD-MCNC: 12.5 G/DL — LOW (ref 13–17)
MAGNESIUM SERPL-MCNC: 2.1 MG/DL — SIGNIFICANT CHANGE UP (ref 1.6–2.6)
MCHC RBC-ENTMCNC: 28 PG — SIGNIFICANT CHANGE UP (ref 27–34)
MCHC RBC-ENTMCNC: 31.3 GM/DL — LOW (ref 32–36)
MCV RBC AUTO: 89.5 FL — SIGNIFICANT CHANGE UP (ref 80–100)
NRBC # BLD: 0 /100 WBCS — SIGNIFICANT CHANGE UP (ref 0–0)
PLATELET # BLD AUTO: 319 K/UL — SIGNIFICANT CHANGE UP (ref 150–400)
POTASSIUM SERPL-MCNC: 4.4 MMOL/L — SIGNIFICANT CHANGE UP (ref 3.5–5.3)
POTASSIUM SERPL-SCNC: 4.4 MMOL/L — SIGNIFICANT CHANGE UP (ref 3.5–5.3)
RBC # BLD: 4.47 M/UL — SIGNIFICANT CHANGE UP (ref 4.2–5.8)
RBC # FLD: 14.3 % — SIGNIFICANT CHANGE UP (ref 10.3–14.5)
SODIUM SERPL-SCNC: 138 MMOL/L — SIGNIFICANT CHANGE UP (ref 135–145)
SPECIMEN SOURCE: SIGNIFICANT CHANGE UP
WBC # BLD: 7.42 K/UL — SIGNIFICANT CHANGE UP (ref 3.8–10.5)
WBC # FLD AUTO: 7.42 K/UL — SIGNIFICANT CHANGE UP (ref 3.8–10.5)

## 2019-02-10 PROCEDURE — 96374 THER/PROPH/DIAG INJ IV PUSH: CPT | Mod: XU

## 2019-02-10 PROCEDURE — 71250 CT THORAX DX C-: CPT

## 2019-02-10 PROCEDURE — 36415 COLL VENOUS BLD VENIPUNCTURE: CPT

## 2019-02-10 PROCEDURE — 87486 CHLMYD PNEUM DNA AMP PROBE: CPT

## 2019-02-10 PROCEDURE — 87491 CHLMYD TRACH DNA AMP PROBE: CPT

## 2019-02-10 PROCEDURE — 90662 IIV NO PRSV INCREASED AG IM: CPT

## 2019-02-10 PROCEDURE — 80202 ASSAY OF VANCOMYCIN: CPT

## 2019-02-10 PROCEDURE — 96376 TX/PRO/DX INJ SAME DRUG ADON: CPT

## 2019-02-10 PROCEDURE — 99285 EMERGENCY DEPT VISIT HI MDM: CPT | Mod: 25

## 2019-02-10 PROCEDURE — 80048 BASIC METABOLIC PNL TOTAL CA: CPT

## 2019-02-10 PROCEDURE — 96372 THER/PROPH/DIAG INJ SC/IM: CPT | Mod: XU

## 2019-02-10 PROCEDURE — 86593 SYPHILIS TEST NON-TREP QUANT: CPT

## 2019-02-10 PROCEDURE — 86592 SYPHILIS TEST NON-TREP QUAL: CPT

## 2019-02-10 PROCEDURE — 80307 DRUG TEST PRSMV CHEM ANLYZR: CPT

## 2019-02-10 PROCEDURE — 80053 COMPREHEN METABOLIC PANEL: CPT

## 2019-02-10 PROCEDURE — 87040 BLOOD CULTURE FOR BACTERIA: CPT

## 2019-02-10 PROCEDURE — 85730 THROMBOPLASTIN TIME PARTIAL: CPT

## 2019-02-10 PROCEDURE — 87798 DETECT AGENT NOS DNA AMP: CPT

## 2019-02-10 PROCEDURE — 96375 TX/PRO/DX INJ NEW DRUG ADDON: CPT | Mod: XU

## 2019-02-10 PROCEDURE — 81003 URINALYSIS AUTO W/O SCOPE: CPT

## 2019-02-10 PROCEDURE — 87150 DNA/RNA AMPLIFIED PROBE: CPT

## 2019-02-10 PROCEDURE — 93005 ELECTROCARDIOGRAM TRACING: CPT

## 2019-02-10 PROCEDURE — 73206 CT ANGIO UPR EXTRM W/O&W/DYE: CPT

## 2019-02-10 PROCEDURE — 87633 RESP VIRUS 12-25 TARGETS: CPT

## 2019-02-10 PROCEDURE — 87536 HIV-1 QUANT&REVRSE TRNSCRPJ: CPT

## 2019-02-10 PROCEDURE — 86359 T CELLS TOTAL COUNT: CPT

## 2019-02-10 PROCEDURE — 87581 M.PNEUMON DNA AMP PROBE: CPT

## 2019-02-10 PROCEDURE — 85025 COMPLETE CBC W/AUTO DIFF WBC: CPT

## 2019-02-10 PROCEDURE — 87186 SC STD MICRODIL/AGAR DIL: CPT

## 2019-02-10 PROCEDURE — 71046 X-RAY EXAM CHEST 2 VIEWS: CPT

## 2019-02-10 PROCEDURE — 86140 C-REACTIVE PROTEIN: CPT

## 2019-02-10 PROCEDURE — 83605 ASSAY OF LACTIC ACID: CPT

## 2019-02-10 PROCEDURE — 85652 RBC SED RATE AUTOMATED: CPT

## 2019-02-10 PROCEDURE — 87591 N.GONORRHOEAE DNA AMP PROB: CPT

## 2019-02-10 PROCEDURE — 85027 COMPLETE CBC AUTOMATED: CPT

## 2019-02-10 PROCEDURE — 85610 PROTHROMBIN TIME: CPT

## 2019-02-10 PROCEDURE — 83735 ASSAY OF MAGNESIUM: CPT

## 2019-02-10 PROCEDURE — 73130 X-RAY EXAM OF HAND: CPT

## 2019-02-10 PROCEDURE — 86780 TREPONEMA PALLIDUM: CPT

## 2019-02-10 PROCEDURE — 99239 HOSP IP/OBS DSCHRG MGMT >30: CPT

## 2019-02-10 RX ORDER — INFLUENZA VIRUS VACCINE 15; 15; 15; 15 UG/.5ML; UG/.5ML; UG/.5ML; UG/.5ML
0.5 SUSPENSION INTRAMUSCULAR ONCE
Qty: 0 | Refills: 0 | Status: COMPLETED | OUTPATIENT
Start: 2019-02-10 | End: 2019-02-10

## 2019-02-10 RX ADMIN — Medication 300 MILLIGRAM(S): at 01:59

## 2019-02-10 RX ADMIN — ELVITEGRAVIR, COBICISTAT, EMTRICITABINE, AND TENOFOVIR ALAFENAMIDE 1 TABLET(S): 150; 150; 200; 10 TABLET ORAL at 12:09

## 2019-02-10 RX ADMIN — Medication 300 MILLIGRAM(S): at 12:09

## 2019-02-10 RX ADMIN — INFLUENZA VIRUS VACCINE 0.5 MILLILITER(S): 15; 15; 15; 15 SUSPENSION INTRAMUSCULAR at 16:09

## 2019-02-10 NOTE — DISCHARGE NOTE ADULT - ADDITIONAL INSTRUCTIONS
Please schedule follow up with your primary care / infectious disease physician. You can call their office on Monday to make an appointment.

## 2019-02-10 NOTE — DISCHARGE NOTE ADULT - CARE PLAN
Principal Discharge DX:	Acute viral syndrome  Goal:	Management of symptoms.  Assessment and plan of treatment:	You were admitted due to muscle aches, fever, chills and nausea. These symptoms were treated with rest, hydration and observation and resolved spontaneously.     There was initially a concern that you should be evaluated to make sure you did not have a more serious condition called endocarditis because a blood culture was positive. A repeat blood culture was negative, and a test of the level of inflammation in your body (ESR) was at a normal level. It is thought you do not have a blood stream infection and you do not have endocarditis. Therefore, you do not currently need antibiotics.     You can follow up with your primary care physician routinely.  Secondary Diagnosis:	HIV (human immunodeficiency virus infection)  Assessment and plan of treatment:	Please continue your home Gemvoya and follow up with your infectious disease provider in the next 1-2 weeks.  Secondary Diagnosis:	Drug abuse, daily use  Assessment and plan of treatment:	It is recommended that you stop using drugs like methamphetamine and GHB and enter a substance abuse rehabilitation program. You were seen by a psychiatric provider and were not having any serious acute psychiatric symptoms. You declined referral to outpatient services for persons living with addiction.

## 2019-02-10 NOTE — DISCHARGE NOTE ADULT - PATIENT PORTAL LINK FT
You can access the [facility] Patient Portal, offered by [facility], by registering with the following website: [email or web address]

## 2019-02-10 NOTE — DISCHARGE NOTE ADULT - PLAN OF CARE
Management of symptoms. You were admitted due to muscle aches, fever, chills and nausea. These symptoms were treated with rest, hydration and observation and resolved spontaneously.     There was initially a concern that you should be evaluated to make sure you did not have a more serious condition called endocarditis because a blood culture was positive. A repeat blood culture was negative, and a test of the level of inflammation in your body (ESR) was at a normal level. It is thought you do not have a blood stream infection and you do not have endocarditis. Therefore, you do not currently need antibiotics.     You can follow up with your primary care physician routinely. Please continue your home Gemvoya and follow up with your infectious disease provider in the next 1-2 weeks. It is recommended that you stop using drugs like methamphetamine and GHB and enter a substance abuse rehabilitation program. You were seen by a psychiatric provider and were not having any serious acute psychiatric symptoms. You declined referral to outpatient services for persons living with addiction.

## 2019-02-10 NOTE — DISCHARGE NOTE ADULT - HOSPITAL COURSE
52 yo M with PMHx of HIV (last CD4 800-900, VL undetectable approx 6 months ago, on HAART with Genvoya non-compliant), hx of PCP (2007), depression, anxiety presenting TO [facility] on 2/7 with complaints of R hand numbness and pain x 5d. Patient notes that approximately 1 week ago, he was out with some friends when he used Crystal meth and GHB. Patient reports going [facility] on [facility], where he was then instructed to proceed to the ED for further evaluation. ROS also positive for subjective fevers, chills, NS, shortness of breath, dry cough, intermittent nausea, myalgias x5 days. In the ED, T 97.6-98.9 F; HR 72-87; -131/65-86; RR 16-18; Sp02  on room air. Labs with no leukocytosis, negative lactate, Utox negative, CRP 0.9, s/p Vancomycin 1 g x1, s/p PCN G 2.4 U IM x1, s/p Dilaudid 1 mg IVPx1, Percocet 5/325 x2, Bcx sent/pending, CT Chest with bronchiectasis with surrounding atelectasis or some mild peribronchovascular nodularity in the ION, corresponding to the opacity on the chest x-rays from 2/7/2019 and likely due to prior tuberculosis infection, with no active infection suspected. A blood culture drawn on admission was positive for Gram + cocci in clusters so the patient was admitted to rule out possibility of endocarditis. A repeat blood culture was negative x24 hours and ESR was 11 (normal), so endocarditis was ruled out. A psychiatric assessment was performed and the patient was not determined to be suffering from an acute psychiatric illness requiring inpatient admission. The patient was determined to be stable for discharge home on 2/10/19.

## 2019-02-11 LAB
4/8 RATIO: 0.9 RATIO — SIGNIFICANT CHANGE UP (ref 0.9–3.6)
ABS CD8: 681 /UL — SIGNIFICANT CHANGE UP (ref 142–740)
CD3 BLASTS SPEC-ACNC: 1295 /UL — SIGNIFICANT CHANGE UP (ref 672–1870)
CD3 BLASTS SPEC-ACNC: 83 % — SIGNIFICANT CHANGE UP (ref 59–83)
CD4 %: 39 % — SIGNIFICANT CHANGE UP (ref 30–62)
CD8 %: 44 % — HIGH (ref 12–36)
HIV-1 VIRAL LOAD RESULT: ABNORMAL
HIV1 RNA # SERPL NAA+PROBE: SIGNIFICANT CHANGE UP
HIV1 RNA SER-IMP: SIGNIFICANT CHANGE UP
HIV1 RNA SERPL NAA+PROBE-ACNC: ABNORMAL
HIV1 RNA SERPL NAA+PROBE-LOG#: ABNORMAL LG COP/ML
T-CELL CD4 SUBSET PNL BLD: 610 /UL — SIGNIFICANT CHANGE UP (ref 489–1457)

## 2019-02-12 LAB
-  AMPICILLIN/SULBACTAM: SIGNIFICANT CHANGE UP
-  CEFAZOLIN: SIGNIFICANT CHANGE UP
-  CLINDAMYCIN: SIGNIFICANT CHANGE UP
-  ERYTHROMYCIN: SIGNIFICANT CHANGE UP
-  GENTAMICIN: SIGNIFICANT CHANGE UP
-  OXACILLIN: SIGNIFICANT CHANGE UP
-  RIFAMPIN: SIGNIFICANT CHANGE UP
-  TETRACYCLINE: SIGNIFICANT CHANGE UP
-  TRIMETHOPRIM/SULFAMETHOXAZOLE: SIGNIFICANT CHANGE UP
-  VANCOMYCIN: SIGNIFICANT CHANGE UP
CULTURE RESULTS: SIGNIFICANT CHANGE UP
METHOD TYPE: SIGNIFICANT CHANGE UP
ORGANISM # SPEC MICROSCOPIC CNT: SIGNIFICANT CHANGE UP
SPECIMEN SOURCE: SIGNIFICANT CHANGE UP

## 2019-02-13 DIAGNOSIS — D64.9 ANEMIA, UNSPECIFIED: ICD-10-CM

## 2019-02-13 DIAGNOSIS — Z21 ASYMPTOMATIC HUMAN IMMUNODEFICIENCY VIRUS [HIV] INFECTION STATUS: ICD-10-CM

## 2019-02-13 DIAGNOSIS — F41.8 OTHER SPECIFIED ANXIETY DISORDERS: ICD-10-CM

## 2019-02-13 DIAGNOSIS — B34.9 VIRAL INFECTION, UNSPECIFIED: ICD-10-CM

## 2019-02-13 DIAGNOSIS — F15.29 OTHER STIMULANT DEPENDENCE WITH UNSPECIFIED STIMULANT-INDUCED DISORDER: ICD-10-CM

## 2019-02-13 DIAGNOSIS — M79.643 PAIN IN UNSPECIFIED HAND: ICD-10-CM

## 2019-02-13 DIAGNOSIS — A51.49 OTHER SECONDARY SYPHILITIC CONDITIONS: ICD-10-CM

## 2019-02-13 LAB
CULTURE RESULTS: SIGNIFICANT CHANGE UP
CULTURE RESULTS: SIGNIFICANT CHANGE UP
SPECIMEN SOURCE: SIGNIFICANT CHANGE UP
SPECIMEN SOURCE: SIGNIFICANT CHANGE UP

## 2019-02-14 LAB
CULTURE RESULTS: SIGNIFICANT CHANGE UP
SPECIMEN SOURCE: SIGNIFICANT CHANGE UP

## 2023-01-11 NOTE — DISCHARGE NOTE ADULT - MEDICATION SUMMARY - MEDICATIONS TO TAKE
Methylphenidate Pending    Insurance response  Prescription Drug Insurance: [employer]  Notes: Prior authorization submitted - will update provider when decision has been made by insurance.            I will START or STAY ON the medications listed below when I get home from the hospital:    Genvoya oral tablet  -- 1 tab(s) by mouth once a day  -- Indication: For HIV (human immunodeficiency virus infection)    Nasonex 50 mcg/inh nasal spray  -- 2 spray(s) into nose once a day, As Needed  -- Indication: For Allergic rhinitis